# Patient Record
Sex: FEMALE | Race: OTHER | URBAN - METROPOLITAN AREA
[De-identification: names, ages, dates, MRNs, and addresses within clinical notes are randomized per-mention and may not be internally consistent; named-entity substitution may affect disease eponyms.]

---

## 2020-07-15 ENCOUNTER — OUT OF OFFICE VISIT (OUTPATIENT)
Dept: URBAN - METROPOLITAN AREA MEDICAL CENTER 30 | Facility: MEDICAL CENTER | Age: 40
End: 2020-07-15
Payer: COMMERCIAL

## 2020-07-15 DIAGNOSIS — K92.1 BLACK STOOL: ICD-10-CM

## 2020-07-15 DIAGNOSIS — K44.9 DIAPHRAGMATIC HERNIA: ICD-10-CM

## 2020-07-15 DIAGNOSIS — D50.9 ANEMIA, IRON DEFICIENCY: ICD-10-CM

## 2020-07-15 DIAGNOSIS — R93.3 ABN FINDINGS-GI TRACT: ICD-10-CM

## 2020-07-15 DIAGNOSIS — C49.A3 GIST (GASTROINTESTINAL STROMAL TUMOR) OF SMALL BOWEL, MALIGNANT: ICD-10-CM

## 2020-07-15 PROCEDURE — 99222 1ST HOSP IP/OBS MODERATE 55: CPT | Performed by: INTERNAL MEDICINE

## 2020-07-15 PROCEDURE — G9937 DIG OR SURV COLSCO: HCPCS | Performed by: INTERNAL MEDICINE

## 2020-07-15 PROCEDURE — G8427 DOCREV CUR MEDS BY ELIG CLIN: HCPCS | Performed by: INTERNAL MEDICINE

## 2020-07-15 PROCEDURE — 43239 EGD BIOPSY SINGLE/MULTIPLE: CPT | Performed by: INTERNAL MEDICINE

## 2020-07-15 PROCEDURE — 45378 DIAGNOSTIC COLONOSCOPY: CPT | Performed by: INTERNAL MEDICINE

## 2020-07-17 ENCOUNTER — OUT OF OFFICE VISIT (OUTPATIENT)
Dept: URBAN - METROPOLITAN AREA MEDICAL CENTER 30 | Facility: MEDICAL CENTER | Age: 40
End: 2020-07-17
Payer: COMMERCIAL

## 2020-07-17 DIAGNOSIS — K31.89 ACQUIRED DEFORMITY OF PYLORUS: ICD-10-CM

## 2020-07-17 DIAGNOSIS — K92.1 BLACK STOOL: ICD-10-CM

## 2020-07-17 DIAGNOSIS — D50.0 ANEMIA DUE TO BLOOD LOSS: ICD-10-CM

## 2020-07-17 PROCEDURE — 99232 SBSQ HOSP IP/OBS MODERATE 35: CPT | Performed by: PHYSICIAN ASSISTANT

## 2020-07-22 ENCOUNTER — TELEPHONE ENCOUNTER (OUTPATIENT)
Dept: URBAN - METROPOLITAN AREA CLINIC 74 | Facility: CLINIC | Age: 40
End: 2020-07-22

## 2020-08-14 ENCOUNTER — OFFICE VISIT (OUTPATIENT)
Dept: URBAN - METROPOLITAN AREA CLINIC 74 | Facility: CLINIC | Age: 40
End: 2020-08-14

## 2020-12-03 ENCOUNTER — OFFICE VISIT (OUTPATIENT)
Dept: URBAN - METROPOLITAN AREA CLINIC 74 | Facility: CLINIC | Age: 40
End: 2020-12-03

## 2021-01-21 ENCOUNTER — OFFICE VISIT (OUTPATIENT)
Dept: URBAN - METROPOLITAN AREA CLINIC 74 | Facility: CLINIC | Age: 41
End: 2021-01-21

## 2021-01-28 ENCOUNTER — WEB ENCOUNTER (OUTPATIENT)
Dept: URBAN - METROPOLITAN AREA CLINIC 74 | Facility: CLINIC | Age: 41
End: 2021-01-28

## 2021-01-28 ENCOUNTER — OFFICE VISIT (OUTPATIENT)
Dept: URBAN - METROPOLITAN AREA CLINIC 74 | Facility: CLINIC | Age: 41
End: 2021-01-28
Payer: COMMERCIAL

## 2021-01-28 DIAGNOSIS — C49.A3 GIST (GASTROINTESTINAL STROMAL TUMOR) OF SMALL BOWEL, MALIGNANT: ICD-10-CM

## 2021-01-28 DIAGNOSIS — K44.9 HIATAL HERNIA: ICD-10-CM

## 2021-01-28 PROBLEM — 445737002: Status: ACTIVE | Noted: 2021-01-28

## 2021-01-28 PROCEDURE — G8417 CALC BMI ABV UP PARAM F/U: HCPCS | Performed by: INTERNAL MEDICINE

## 2021-01-28 PROCEDURE — G8483 FLU IMM NO ADMIN DOC REA: HCPCS | Performed by: INTERNAL MEDICINE

## 2021-01-28 PROCEDURE — 99214 OFFICE O/P EST MOD 30 MIN: CPT | Performed by: INTERNAL MEDICINE

## 2021-01-28 PROCEDURE — G9903 PT SCRN TBCO ID AS NON USER: HCPCS | Performed by: INTERNAL MEDICINE

## 2021-01-28 PROCEDURE — G8427 DOCREV CUR MEDS BY ELIG CLIN: HCPCS | Performed by: INTERNAL MEDICINE

## 2021-01-28 RX ORDER — FERROUS SULFATE 325(65) MG
1 TABLET TABLET ORAL ONCE A DAY
Status: ACTIVE | COMMUNITY

## 2021-01-28 RX ORDER — ASCORBIC ACID 100 MG
1 TABLET TABLET,CHEWABLE ORAL ONCE A DAY
Status: ACTIVE | COMMUNITY

## 2021-01-28 RX ORDER — CHOLECALCIFEROL (VITAMIN D3) 1250 MCG
1 CAPSULE CAPSULE ORAL
Status: ACTIVE | COMMUNITY

## 2021-01-28 NOTE — HPI-TODAY'S VISIT:
Ms. Dennis is a 40-year-old black female presenting for hospital follow-up.  We saw her in the hospital back in July.  She was admitted on July 15 with melena and a hemoglobin of 6.5.  CT scan showed a mass in either the pelvis or the small bowel itself.  With an EGD and colonoscopy in July 16.  The EGD showed a small hiatal hernia.  The rest of the upper GI tract was grossly normal.  Biopsies were taken of the small bowel to rule out celiac sprue.  Colonoscopy showed blood throughout the colon and even in the terminal ileum.  General surgery was urgently consulted and she was taken to the OR where a 6 centimeter tumor was removed from the small bowel.  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  Margins were noted to be negative.  She was seen by Dr. Truong in oncology.  He spoke to her about Gleevec and patient declined.  She has been on iron and her hemoglobin has gotten back to normal.  She is being watched with labs as well as imaging intermittently.  Patient states she has done well since discharge.  She denies any abdominal pain.  She denies any nausea.  She has occasional bloating and reflux.  Biopsies of her small bowel came back as positive for peptic duodenitis.  She admits to taking ibuprofen during her menstrual cycle.

## 2021-01-28 NOTE — PHYSICAL EXAM CONSTITUTIONAL:
Overweight BF well developed, well nourished , in no acute distress , ambulating without difficulty , normal communication ability

## 2021-07-26 ENCOUNTER — APPOINTMENT (RX ONLY)
Dept: URBAN - METROPOLITAN AREA OTHER 10 | Facility: OTHER | Age: 41
Setting detail: DERMATOLOGY
End: 2021-07-26

## 2021-07-26 DIAGNOSIS — L08.9 LOCAL INFECTION OF THE SKIN AND SUBCUTANEOUS TISSUE, UNSPECIFIED: ICD-10-CM

## 2021-07-26 DIAGNOSIS — L84 CORNS AND CALLOSITIES: ICD-10-CM

## 2021-07-26 PROCEDURE — ? ORDER TESTS

## 2021-07-26 PROCEDURE — 99204 OFFICE O/P NEW MOD 45 MIN: CPT

## 2021-07-26 PROCEDURE — ? PRESCRIPTION

## 2021-07-26 PROCEDURE — ? COUNSELING

## 2021-07-26 RX ORDER — TRIAMCINOLONE ACETONIDE 1 MG/G
CREAM TOPICAL
Qty: 80 | Refills: 0 | Status: ERX | COMMUNITY
Start: 2021-07-26

## 2021-07-26 RX ORDER — CLOBETASOL PROPIONATE 0.5 MG/G
CREAM TOPICAL
Qty: 15 | Refills: 0 | Status: ERX | COMMUNITY
Start: 2021-07-26

## 2021-07-26 RX ADMIN — TRIAMCINOLONE ACETONIDE: 1 CREAM TOPICAL at 00:00

## 2021-07-26 RX ADMIN — CLOBETASOL PROPIONATE: 0.5 CREAM TOPICAL at 00:00

## 2021-07-26 ASSESSMENT — LOCATION SIMPLE DESCRIPTION DERM
LOCATION SIMPLE: RIGHT HAND
LOCATION SIMPLE: ABDOMEN
LOCATION SIMPLE: LEFT PLANTAR SURFACE
LOCATION SIMPLE: LEFT HAND
LOCATION SIMPLE: ABDOMEN

## 2021-07-26 ASSESSMENT — LOCATION DETAILED DESCRIPTION DERM
LOCATION DETAILED: SUBXIPHOID
LOCATION DETAILED: SUBXIPHOID
LOCATION DETAILED: LEFT MEDIAL PLANTAR MIDFOOT
LOCATION DETAILED: LEFT THENAR EMINENCE
LOCATION DETAILED: RIGHT ULNAR PALM

## 2021-07-26 ASSESSMENT — LOCATION ZONE DERM
LOCATION ZONE: TRUNK
LOCATION ZONE: HAND
LOCATION ZONE: FEET
LOCATION ZONE: TRUNK

## 2021-07-26 NOTE — HPI: RASH
What Type Of Note Output Would You Prefer (Optional)?: Standard Output
Is The Patient Presenting As Previously Scheduled?: Yes
How Severe Is Your Rash?: moderate
Is This A New Presentation, Or A Follow-Up?: Rash
Additional History: Tried cortison has since stopped. \\nPossibly heat induced.
no

## 2021-07-26 NOTE — PROCEDURE: ORDER TESTS
Expected Date Of Service: 07/26/2021
Billing Type: Third-Party Bill
Performing Laboratory: 0
Bill For Surgical Tray: no

## 2021-07-29 ENCOUNTER — RX ONLY (OUTPATIENT)
Age: 41
Setting detail: RX ONLY
End: 2021-07-29

## 2021-07-29 RX ORDER — DOXYCYCLINE HYCLATE 100 MG/1
CAPSULE, GELATIN COATED ORAL
Qty: 28 | Refills: 0 | Status: ERX | COMMUNITY
Start: 2021-07-29

## 2021-08-10 ENCOUNTER — APPOINTMENT (RX ONLY)
Dept: URBAN - METROPOLITAN AREA OTHER 10 | Facility: OTHER | Age: 41
Setting detail: DERMATOLOGY
End: 2021-08-10

## 2021-08-10 DIAGNOSIS — L08.9 LOCAL INFECTION OF THE SKIN AND SUBCUTANEOUS TISSUE, UNSPECIFIED: ICD-10-CM

## 2021-08-10 PROCEDURE — ? COUNSELING

## 2021-08-10 PROCEDURE — ? OTHER

## 2021-08-10 PROCEDURE — ? PRESCRIPTION

## 2021-08-10 PROCEDURE — 99214 OFFICE O/P EST MOD 30 MIN: CPT

## 2021-08-10 RX ORDER — CLOBETASOL PROPIONATE 0.5 MG/G
CREAM TOPICAL
Qty: 60 | Refills: 0 | Status: ERX

## 2021-08-10 RX ORDER — PREDNISONE 10 MG/1
TABLET ORAL
Qty: 17 | Refills: 0 | Status: ERX | COMMUNITY
Start: 2021-08-10

## 2021-08-10 RX ADMIN — PREDNISONE: 10 TABLET ORAL at 00:00

## 2021-08-10 ASSESSMENT — LOCATION SIMPLE DESCRIPTION DERM: LOCATION SIMPLE: LEFT HAND

## 2021-08-10 ASSESSMENT — LOCATION DETAILED DESCRIPTION DERM: LOCATION DETAILED: LEFT ULNAR PALM

## 2021-08-10 ASSESSMENT — LOCATION ZONE DERM: LOCATION ZONE: HAND

## 2021-08-10 NOTE — PROCEDURE: OTHER
Detail Level: Simple
Render Risk Assessment In Note?: no
Note Text (......Xxx Chief Complaint.): This diagnosis correlates with the
Other (Free Text): Labs discussed with patient

## 2021-09-10 ENCOUNTER — APPOINTMENT (RX ONLY)
Dept: URBAN - METROPOLITAN AREA OTHER 10 | Facility: OTHER | Age: 41
Setting detail: DERMATOLOGY
End: 2021-09-10

## 2021-09-10 DIAGNOSIS — L42 PITYRIASIS ROSEA: ICD-10-CM

## 2021-09-10 PROCEDURE — ? PRESCRIPTION

## 2021-09-10 PROCEDURE — 99213 OFFICE O/P EST LOW 20 MIN: CPT

## 2021-09-10 PROCEDURE — ? COUNSELING

## 2021-09-10 RX ORDER — UREA 40 %
CREAM (GRAM) TOPICAL
Qty: 198 | Refills: 6 | Status: ERX | COMMUNITY
Start: 2021-09-10

## 2021-09-10 RX ADMIN — Medication: at 00:00

## 2021-09-10 ASSESSMENT — LOCATION ZONE DERM
LOCATION ZONE: HAND
LOCATION ZONE: FEET

## 2021-09-10 ASSESSMENT — LOCATION SIMPLE DESCRIPTION DERM
LOCATION SIMPLE: RIGHT HAND
LOCATION SIMPLE: LEFT HAND
LOCATION SIMPLE: RIGHT PLANTAR SURFACE
LOCATION SIMPLE: LEFT PLANTAR SURFACE

## 2021-09-10 ASSESSMENT — LOCATION DETAILED DESCRIPTION DERM
LOCATION DETAILED: LEFT THENAR EMINENCE
LOCATION DETAILED: LEFT LATERAL PLANTAR MIDFOOT
LOCATION DETAILED: LEFT RADIAL PALM
LOCATION DETAILED: RIGHT MEDIAL PLANTAR MIDFOOT
LOCATION DETAILED: RIGHT ULNAR PALM

## 2022-01-27 ENCOUNTER — OFFICE VISIT (OUTPATIENT)
Dept: URBAN - METROPOLITAN AREA CLINIC 74 | Facility: CLINIC | Age: 42
End: 2022-01-27

## 2022-01-27 VITALS — HEIGHT: 64 IN

## 2022-01-27 RX ORDER — CHOLECALCIFEROL (VITAMIN D3) 1250 MCG
1 CAPSULE CAPSULE ORAL
Status: ACTIVE | COMMUNITY

## 2022-01-27 RX ORDER — ASCORBIC ACID 100 MG
1 TABLET TABLET,CHEWABLE ORAL ONCE A DAY
Status: ACTIVE | COMMUNITY

## 2022-01-27 RX ORDER — FERROUS SULFATE 325(65) MG
1 TABLET TABLET ORAL ONCE A DAY
Status: ACTIVE | COMMUNITY

## 2022-04-04 ENCOUNTER — DASHBOARD ENCOUNTERS (OUTPATIENT)
Age: 42
End: 2022-04-04

## 2022-04-08 ENCOUNTER — OFFICE VISIT (OUTPATIENT)
Dept: URBAN - METROPOLITAN AREA CLINIC 74 | Facility: CLINIC | Age: 42
End: 2022-04-08

## 2022-04-08 VITALS — HEIGHT: 64 IN

## 2022-04-08 RX ORDER — ASCORBIC ACID 100 MG
1 TABLET TABLET,CHEWABLE ORAL ONCE A DAY
Status: ACTIVE | COMMUNITY

## 2022-04-08 RX ORDER — FERROUS SULFATE 325(65) MG
1 TABLET TABLET ORAL ONCE A DAY
Status: ACTIVE | COMMUNITY

## 2022-04-08 RX ORDER — CHOLECALCIFEROL (VITAMIN D3) 1250 MCG
1 CAPSULE CAPSULE ORAL
Status: ACTIVE | COMMUNITY